# Patient Record
Sex: MALE | Race: BLACK OR AFRICAN AMERICAN | NOT HISPANIC OR LATINO | URBAN - METROPOLITAN AREA
[De-identification: names, ages, dates, MRNs, and addresses within clinical notes are randomized per-mention and may not be internally consistent; named-entity substitution may affect disease eponyms.]

---

## 2023-11-19 ENCOUNTER — EMERGENCY (EMERGENCY)
Facility: HOSPITAL | Age: 28
LOS: 1 days | Discharge: HOME | End: 2023-11-19
Attending: EMERGENCY MEDICINE | Admitting: EMERGENCY MEDICINE
Payer: SELF-PAY

## 2023-11-19 VITALS
OXYGEN SATURATION: 99 % | RESPIRATION RATE: 16 BRPM | SYSTOLIC BLOOD PRESSURE: 125 MMHG | HEART RATE: 60 BPM | TEMPERATURE: 99 F | DIASTOLIC BLOOD PRESSURE: 83 MMHG

## 2023-11-19 PROCEDURE — 99283 EMERGENCY DEPT VISIT LOW MDM: CPT

## 2023-11-19 RX ORDER — ACETAMINOPHEN 500 MG
650 TABLET ORAL ONCE
Refills: 0 | Status: COMPLETED | OUTPATIENT
Start: 2023-11-19 | End: 2023-11-19

## 2023-11-19 RX ORDER — IBUPROFEN 200 MG
400 TABLET ORAL ONCE
Refills: 0 | Status: COMPLETED | OUTPATIENT
Start: 2023-11-19 | End: 2023-11-19

## 2023-11-19 RX ADMIN — Medication 400 MILLIGRAM(S): at 15:49

## 2023-11-19 RX ADMIN — Medication 650 MILLIGRAM(S): at 15:49

## 2023-11-19 NOTE — ED PROVIDER NOTE - NSFOLLOWUPINSTRUCTIONS_ED_ALL_ED_FT
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    You may take 400mg of ibuprofen (example: motrin or advil) every 4-6 hours for baseline pain control as indicated with respect to the warnings on the label. This is an over the counter medication.    You may take 1000mg of Tylenol every 6 hours for baseline pain control with respect to the warnings on the label.    ice can also help    the stone from your tonsil was a tonsillith  it isn't dangerous. 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    You may take 400mg of ibuprofen (example: motrin or advil) every 4-6 hours for baseline pain control as indicated with respect to the warnings on the label. This is an over the counter medication.    You may take 1000mg of Tylenol every 6 hours for baseline pain control with respect to the warnings on the label.    ice can also help    the stone from your tonsil was a tonsillith  it isn't dangerous. If you develop fever or increasing pain then return to the hospital
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
## 2023-11-19 NOTE — ED ADULT NURSE NOTE - NSFALLUNIVINTERV_ED_ALL_ED
Bed/Stretcher in lowest position, wheels locked, appropriate side rails in place/Call bell, personal items and telephone in reach/Instruct patient to call for assistance before getting out of bed/chair/stretcher/Non-slip footwear applied when patient is off stretcher/[place] to call system/Physically safe environment - no spills, clutter or unnecessary equipment/Purposeful proactive rounding/Room/bathroom lighting operational, light cord in reach

## 2023-11-19 NOTE — PROVIDER CONTACT NOTE (OTHER) - ASSESSMENT
medical team informed pt is medically cleared for discharge. Pt is 29 y/o male and residing at [facility] at [address], [place] .  Pt speaks Lithuanian and  was utilized. Pt stated he is from [place] and pts G # is – I [phone] and nuris # HER. Writer reached out to [phone] and [phone]  there is message stating “ I am sorry the VM is not set up yet”. However this #   [phone]  left multiple messages   requesting a phone call.  Case was discussed with the team and decided to use [facility] and booking # [id number] and fare USD 13.84. Writer informed the medical team and in agreement with this plan. No further SW intervention needed for this visit.

## 2023-11-19 NOTE — ED PROVIDER NOTE - CARE PLAN
1 Principal Discharge DX:	Left knee pain  Secondary Diagnosis:	Patellofemoral joint pain, left  Secondary Diagnosis:	Tonsillith

## 2023-11-19 NOTE — ED PROVIDER NOTE - PATIENT PORTAL LINK FT
You can access the FollowMyHealth Patient Portal offered by [facility] by registering at the following website: [email or web address]. By joining [email or web address]’s FollowMyHealth portal, you will also be able to view your health information using other applications (apps) compatible with our system.

## 2023-11-19 NOTE — ED ADULT NURSE NOTE - OBJECTIVE STATEMENT
[place] RN: pt A&ox4, coming to ED from shelter for left knee pain that has gotten worse when bearing weight. pt denies recent accidents or injuries to left knee. KNA, pt denies Chest pain and SOB.. breathing is spontaneous and unlabored. sating 99% on RA. Bed in lowest position, call bell within reach, all other safety and comfort measures provided. awaiting labs results and further orders.

## 2023-11-19 NOTE — ED ADULT TRIAGE NOTE - CHIEF COMPLAINT QUOTE
pt from migrant shelter c/o left knee pain, ambulatory on scene, worse upon weight bearing activity. pt reports was out walking "looking for work". also reports had a tonsil stone yesterday. says "it smelled". denies any injuries. denies any past medical history.

## 2023-11-19 NOTE — ED PROVIDER NOTE - OBJECTIVE STATEMENT
pt comes to ed for left knee pain from "walking too much".  pt sdenies other injuries, denies trauma or prior injuries to the bone. Dressing: pressure dressing